# Patient Record
Sex: FEMALE | Race: BLACK OR AFRICAN AMERICAN | Employment: OTHER | URBAN - METROPOLITAN AREA
[De-identification: names, ages, dates, MRNs, and addresses within clinical notes are randomized per-mention and may not be internally consistent; named-entity substitution may affect disease eponyms.]

---

## 2017-02-06 ENCOUNTER — HOSPITAL ENCOUNTER (OUTPATIENT)
Dept: PHARMACY | Age: 72
Setting detail: THERAPIES SERIES
Discharge: HOME | End: 2017-02-06
Payer: MEDICARE

## 2017-02-06 DIAGNOSIS — I26.99 OTHER PULMONARY EMBOLISM WITHOUT ACUTE COR PULMONALE, UNSPECIFIED CHRONICITY (HCC): ICD-10-CM

## 2017-02-06 DIAGNOSIS — I82.409 DEEP VEIN THROMBOSIS (DVT) OF LOWER EXTREMITY, UNSPECIFIED CHRONICITY, UNSPECIFIED LATERALITY, UNSPECIFIED VEIN (HCC): ICD-10-CM

## 2017-02-06 LAB
INR BLD: 2.3
PROTIME: 27.5 SECONDS

## 2017-02-06 PROCEDURE — G0463 HOSPITAL OUTPT CLINIC VISIT: HCPCS

## 2017-02-06 PROCEDURE — 85610 PROTHROMBIN TIME: CPT

## 2017-02-27 ENCOUNTER — TELEPHONE (OUTPATIENT)
Dept: PHARMACY | Age: 72
End: 2017-02-27

## 2017-03-08 ENCOUNTER — HOSPITAL ENCOUNTER (OUTPATIENT)
Dept: PHARMACY | Age: 72
Setting detail: THERAPIES SERIES
Discharge: HOME | End: 2017-03-08
Payer: MEDICARE

## 2017-03-08 DIAGNOSIS — I82.409 DEEP VEIN THROMBOSIS (DVT) OF LOWER EXTREMITY, UNSPECIFIED CHRONICITY, UNSPECIFIED LATERALITY, UNSPECIFIED VEIN (HCC): ICD-10-CM

## 2017-03-08 DIAGNOSIS — I26.99 OTHER PULMONARY EMBOLISM WITHOUT ACUTE COR PULMONALE, UNSPECIFIED CHRONICITY (HCC): ICD-10-CM

## 2017-03-08 LAB
INR BLD: 1.9
PROTIME: 22.3 SECONDS

## 2017-03-29 ENCOUNTER — HOSPITAL ENCOUNTER (OUTPATIENT)
Dept: PHARMACY | Age: 72
Setting detail: THERAPIES SERIES
Discharge: HOME | End: 2017-03-29
Payer: MEDICARE

## 2017-03-29 DIAGNOSIS — I82.409 DEEP VEIN THROMBOSIS (DVT) OF LOWER EXTREMITY, UNSPECIFIED CHRONICITY, UNSPECIFIED LATERALITY, UNSPECIFIED VEIN (HCC): ICD-10-CM

## 2017-03-29 DIAGNOSIS — I26.99 OTHER PULMONARY EMBOLISM WITHOUT ACUTE COR PULMONALE, UNSPECIFIED CHRONICITY (HCC): ICD-10-CM

## 2017-03-29 LAB
INR BLD: 1.5
PROTIME: 18.1 SECONDS

## 2017-03-29 PROCEDURE — 85610 PROTHROMBIN TIME: CPT

## 2017-03-29 PROCEDURE — G0463 HOSPITAL OUTPT CLINIC VISIT: HCPCS

## 2017-04-05 ENCOUNTER — HOSPITAL ENCOUNTER (OUTPATIENT)
Dept: PHARMACY | Age: 72
Setting detail: THERAPIES SERIES
Discharge: HOME | End: 2017-04-05
Payer: MEDICARE

## 2017-04-05 DIAGNOSIS — I26.99 OTHER PULMONARY EMBOLISM WITHOUT ACUTE COR PULMONALE, UNSPECIFIED CHRONICITY (HCC): ICD-10-CM

## 2017-04-05 DIAGNOSIS — I82.409 DEEP VEIN THROMBOSIS (DVT) OF LOWER EXTREMITY, UNSPECIFIED CHRONICITY, UNSPECIFIED LATERALITY, UNSPECIFIED VEIN (HCC): ICD-10-CM

## 2017-04-05 LAB
INR BLD: 1.9
PROTIME: 22.4 SECONDS

## 2017-04-05 PROCEDURE — G0463 HOSPITAL OUTPT CLINIC VISIT: HCPCS

## 2017-04-05 PROCEDURE — 85610 PROTHROMBIN TIME: CPT

## 2017-04-19 ENCOUNTER — HOSPITAL ENCOUNTER (OUTPATIENT)
Dept: PHARMACY | Age: 72
Setting detail: THERAPIES SERIES
Discharge: HOME | End: 2017-04-19
Payer: MEDICARE

## 2017-04-19 DIAGNOSIS — I26.99 OTHER PULMONARY EMBOLISM WITHOUT ACUTE COR PULMONALE, UNSPECIFIED CHRONICITY (HCC): ICD-10-CM

## 2017-04-19 DIAGNOSIS — I82.409 DEEP VEIN THROMBOSIS (DVT) OF LOWER EXTREMITY, UNSPECIFIED CHRONICITY, UNSPECIFIED LATERALITY, UNSPECIFIED VEIN (HCC): ICD-10-CM

## 2017-04-19 LAB
INR BLD: 3.7
PROTIME: 43.9 SECONDS

## 2017-04-19 PROCEDURE — 85610 PROTHROMBIN TIME: CPT

## 2017-04-19 PROCEDURE — G0463 HOSPITAL OUTPT CLINIC VISIT: HCPCS

## 2017-05-03 ENCOUNTER — TELEPHONE (OUTPATIENT)
Dept: PHARMACY | Age: 72
End: 2017-05-03

## 2017-05-12 ENCOUNTER — HOSPITAL ENCOUNTER (OUTPATIENT)
Dept: PHARMACY | Age: 72
Setting detail: THERAPIES SERIES
Discharge: HOME | End: 2017-05-12
Payer: MEDICARE

## 2017-05-12 DIAGNOSIS — I26.99 OTHER PULMONARY EMBOLISM WITHOUT ACUTE COR PULMONALE, UNSPECIFIED CHRONICITY (HCC): ICD-10-CM

## 2017-05-12 DIAGNOSIS — I82.409 DEEP VEIN THROMBOSIS (DVT) OF LOWER EXTREMITY, UNSPECIFIED CHRONICITY, UNSPECIFIED LATERALITY, UNSPECIFIED VEIN (HCC): ICD-10-CM

## 2017-05-12 LAB
INR BLD: 2.6
PROTIME: 30.6 SECONDS

## 2017-05-12 PROCEDURE — 85610 PROTHROMBIN TIME: CPT | Performed by: PHARMACIST

## 2017-05-12 PROCEDURE — 99211 OFF/OP EST MAY X REQ PHY/QHP: CPT | Performed by: PHARMACIST

## 2017-06-02 ENCOUNTER — HOSPITAL ENCOUNTER (OUTPATIENT)
Dept: PHARMACY | Age: 72
Setting detail: THERAPIES SERIES
Discharge: HOME | End: 2017-06-02
Payer: MEDICARE

## 2017-06-02 DIAGNOSIS — I26.99 OTHER PULMONARY EMBOLISM WITHOUT ACUTE COR PULMONALE, UNSPECIFIED CHRONICITY (HCC): ICD-10-CM

## 2017-06-02 DIAGNOSIS — I82.409 DEEP VEIN THROMBOSIS (DVT) OF LOWER EXTREMITY, UNSPECIFIED CHRONICITY, UNSPECIFIED LATERALITY, UNSPECIFIED VEIN (HCC): ICD-10-CM

## 2017-06-02 LAB
INR BLD: 3.8
PROTIME: 46.2 SECONDS

## 2017-06-02 PROCEDURE — 99211 OFF/OP EST MAY X REQ PHY/QHP: CPT

## 2017-06-02 PROCEDURE — 85610 PROTHROMBIN TIME: CPT

## 2017-06-06 ENCOUNTER — TELEPHONE (OUTPATIENT)
Dept: PHARMACY | Age: 72
End: 2017-06-06

## 2017-07-13 ENCOUNTER — HOSPITAL ENCOUNTER (OUTPATIENT)
Dept: PHARMACY | Age: 72
Setting detail: THERAPIES SERIES
Discharge: HOME | End: 2017-07-13
Payer: MEDICARE

## 2017-07-13 DIAGNOSIS — I82.409 DEEP VEIN THROMBOSIS (DVT) OF LOWER EXTREMITY, UNSPECIFIED CHRONICITY, UNSPECIFIED LATERALITY, UNSPECIFIED VEIN (HCC): ICD-10-CM

## 2017-07-13 DIAGNOSIS — I26.99 OTHER PULMONARY EMBOLISM WITHOUT ACUTE COR PULMONALE, UNSPECIFIED CHRONICITY (HCC): ICD-10-CM

## 2017-07-13 LAB
INR BLD: 2.2
PROTIME: 26.4 SECONDS

## 2017-07-13 PROCEDURE — 85610 PROTHROMBIN TIME: CPT

## 2017-07-13 PROCEDURE — 99211 OFF/OP EST MAY X REQ PHY/QHP: CPT

## 2017-07-27 ENCOUNTER — HOSPITAL ENCOUNTER (OUTPATIENT)
Dept: PHARMACY | Age: 72
Setting detail: THERAPIES SERIES
Discharge: HOME | End: 2017-07-27
Payer: MEDICARE

## 2017-07-27 ENCOUNTER — TELEPHONE (OUTPATIENT)
Dept: PHARMACY | Age: 72
End: 2017-07-27

## 2017-07-27 DIAGNOSIS — I26.99 OTHER PULMONARY EMBOLISM WITHOUT ACUTE COR PULMONALE, UNSPECIFIED CHRONICITY (HCC): ICD-10-CM

## 2017-07-27 DIAGNOSIS — I82.409 DEEP VEIN THROMBOSIS (DVT) OF LOWER EXTREMITY, UNSPECIFIED CHRONICITY, UNSPECIFIED LATERALITY, UNSPECIFIED VEIN (HCC): ICD-10-CM

## 2017-07-27 LAB
INR BLD: 2.6
PROTIME: 31.7 SECONDS

## 2017-07-27 PROCEDURE — 85610 PROTHROMBIN TIME: CPT

## 2017-07-27 PROCEDURE — 99211 OFF/OP EST MAY X REQ PHY/QHP: CPT

## 2017-07-27 RX ORDER — WARFARIN SODIUM 5 MG/1
TABLET ORAL
Qty: 180 TABLET | Refills: 1 | OUTPATIENT
Start: 2017-07-27

## 2017-09-07 ENCOUNTER — HOSPITAL ENCOUNTER (OUTPATIENT)
Dept: PHARMACY | Age: 72
Setting detail: THERAPIES SERIES
Discharge: HOME | End: 2017-09-07
Payer: MEDICARE

## 2017-09-07 DIAGNOSIS — I82.409 DEEP VEIN THROMBOSIS (DVT) OF LOWER EXTREMITY, UNSPECIFIED CHRONICITY, UNSPECIFIED LATERALITY, UNSPECIFIED VEIN (HCC): ICD-10-CM

## 2017-09-07 DIAGNOSIS — I26.99 OTHER PULMONARY EMBOLISM WITHOUT ACUTE COR PULMONALE, UNSPECIFIED CHRONICITY (HCC): ICD-10-CM

## 2017-09-07 LAB
INR BLD: 2.3
PROTIME: 28 SECONDS

## 2017-09-07 PROCEDURE — 99211 OFF/OP EST MAY X REQ PHY/QHP: CPT

## 2017-09-07 PROCEDURE — 85610 PROTHROMBIN TIME: CPT

## 2017-10-19 ENCOUNTER — HOSPITAL ENCOUNTER (OUTPATIENT)
Dept: PHARMACY | Age: 72
Setting detail: THERAPIES SERIES
Discharge: HOME | End: 2017-10-19
Payer: MEDICARE

## 2017-10-19 DIAGNOSIS — I26.99 OTHER PULMONARY EMBOLISM WITHOUT ACUTE COR PULMONALE, UNSPECIFIED CHRONICITY (HCC): ICD-10-CM

## 2017-10-19 DIAGNOSIS — I82.409 DEEP VEIN THROMBOSIS (DVT) OF LOWER EXTREMITY, UNSPECIFIED CHRONICITY, UNSPECIFIED LATERALITY, UNSPECIFIED VEIN (HCC): ICD-10-CM

## 2017-10-19 LAB
INR BLD: 2.3
PROTIME: 27.1 SECONDS

## 2017-10-19 PROCEDURE — 85610 PROTHROMBIN TIME: CPT

## 2017-10-19 PROCEDURE — 99211 OFF/OP EST MAY X REQ PHY/QHP: CPT

## 2017-10-19 NOTE — PROGRESS NOTES
Patient states compliant with regimen. Bleeding or thromboembolic side effects:  none. Significant med or dietary changes:  none. Significant recent illness or disease state changes:  none. PT/INR done in office per protocol in room #2. INR today is 2.3, within therapeutic range. No changes in therapy were made today--will continue current regimen and RTC in 6 weeks (against suggested time of 5 weeks based upon INR in-range hx). Patient understands dosing directions and information discussed. Dosing schedule given to patient. Progress note sent to referring office. Pt said she still had 3mg tabs at home so I instructed her to get rid of them to avoid the possibility of confusion.

## 2017-11-30 ENCOUNTER — HOSPITAL ENCOUNTER (OUTPATIENT)
Dept: PHARMACY | Age: 72
Setting detail: THERAPIES SERIES
Discharge: HOME | End: 2017-11-30
Payer: MEDICARE

## 2017-11-30 DIAGNOSIS — I26.99 OTHER PULMONARY EMBOLISM WITHOUT ACUTE COR PULMONALE, UNSPECIFIED CHRONICITY (HCC): ICD-10-CM

## 2017-11-30 DIAGNOSIS — I82.409 DEEP VEIN THROMBOSIS (DVT) OF LOWER EXTREMITY, UNSPECIFIED CHRONICITY, UNSPECIFIED LATERALITY, UNSPECIFIED VEIN (HCC): ICD-10-CM

## 2017-11-30 LAB
INR BLD: 2.5
PROTIME: 30.6 SECONDS

## 2017-11-30 PROCEDURE — 99211 OFF/OP EST MAY X REQ PHY/QHP: CPT

## 2017-11-30 PROCEDURE — 85610 PROTHROMBIN TIME: CPT

## 2017-11-30 NOTE — PROGRESS NOTES
Patient states compliant with regimen. Bleeding or thromboembolic side effects:  none. Significant med or dietary changes:  none. Significant recent illness or disease state changes:  none. PT/INR done in office per protocol. INR today is 2.5, within therapeutic range 2.0-3.0. Will continue current regimen 10mg every day and RTC in 6 weeks. Patient seen in room # 2. Patient understands dosing directions and information discussed. Dosing schedule given to patient. Progress note sent to referring office.

## 2018-01-11 ENCOUNTER — HOSPITAL ENCOUNTER (OUTPATIENT)
Dept: PHARMACY | Age: 73
Setting detail: THERAPIES SERIES
Discharge: HOME | End: 2018-01-11
Payer: MEDICARE

## 2018-01-11 DIAGNOSIS — I82.409 DEEP VEIN THROMBOSIS (DVT) OF LOWER EXTREMITY, UNSPECIFIED CHRONICITY, UNSPECIFIED LATERALITY, UNSPECIFIED VEIN (HCC): ICD-10-CM

## 2018-01-11 DIAGNOSIS — I26.99 OTHER PULMONARY EMBOLISM WITHOUT ACUTE COR PULMONALE, UNSPECIFIED CHRONICITY (HCC): ICD-10-CM

## 2018-01-11 LAB
INR BLD: 2.8
PROTIME: 33.5 SECONDS

## 2018-01-11 PROCEDURE — 99211 OFF/OP EST MAY X REQ PHY/QHP: CPT

## 2018-01-11 PROCEDURE — 85610 PROTHROMBIN TIME: CPT

## 2018-02-20 ENCOUNTER — HOSPITAL ENCOUNTER (OUTPATIENT)
Dept: PHARMACY | Age: 73
Setting detail: THERAPIES SERIES
Discharge: HOME | End: 2018-02-20
Payer: MEDICARE

## 2018-02-20 DIAGNOSIS — I82.409 DEEP VEIN THROMBOSIS (DVT) OF LOWER EXTREMITY, UNSPECIFIED CHRONICITY, UNSPECIFIED LATERALITY, UNSPECIFIED VEIN (HCC): ICD-10-CM

## 2018-02-20 DIAGNOSIS — I26.99 OTHER PULMONARY EMBOLISM WITHOUT ACUTE COR PULMONALE, UNSPECIFIED CHRONICITY (HCC): ICD-10-CM

## 2018-02-20 LAB
INR BLD: 3
PROTIME: 36.2 SECONDS

## 2018-02-20 PROCEDURE — 85610 PROTHROMBIN TIME: CPT

## 2018-02-20 PROCEDURE — 99211 OFF/OP EST MAY X REQ PHY/QHP: CPT

## 2018-02-20 NOTE — PROGRESS NOTES
Patient states compliant with regimen. Bleeding or thromboembolic side effects:  none. Significant med or dietary changes:  none. Significant recent illness or disease state changes:  Patient was sick with a cold a few weeks ago. She is feeling better now. She started drinking ginger ale while she was sick. She usually does not drink any pop. Instructed the patient that ginger ale can increase INR when consumed in large quantities, and to stay consistent if she plans on continuing it. Patient states that she plans on stopping, and she just used it while she was not feeling well. PT/INR done in office per protocol. INR goal:  2.0-3.0  INR today:  3.0   Plan: Continue current regimen of 10 mg every day. Next INR check in:   six weeks. Patient seen in room # one. Patient acknowledges working in [address] [place] [address] with Pharmacist as referred by his/her physician. Patient understands dosing directions and information discussed. Dosing schedule given to patient. Progress note sent to referring office.

## 2018-02-22 ENCOUNTER — APPOINTMENT (OUTPATIENT)
Dept: PHARMACY | Age: 73
End: 2018-02-22
Payer: MEDICARE

## 2018-04-03 ENCOUNTER — HOSPITAL ENCOUNTER (OUTPATIENT)
Dept: PHARMACY | Age: 73
Setting detail: THERAPIES SERIES
Discharge: HOME | End: 2018-04-03
Payer: MEDICARE

## 2018-04-03 DIAGNOSIS — I82.409 DEEP VEIN THROMBOSIS (DVT) OF LOWER EXTREMITY, UNSPECIFIED CHRONICITY, UNSPECIFIED LATERALITY, UNSPECIFIED VEIN (HCC): ICD-10-CM

## 2018-04-03 DIAGNOSIS — I26.99 OTHER PULMONARY EMBOLISM WITHOUT ACUTE COR PULMONALE, UNSPECIFIED CHRONICITY (HCC): ICD-10-CM

## 2018-04-03 LAB
INR BLD: 3
PROTIME: 35.7 SECONDS

## 2018-04-03 PROCEDURE — 99211 OFF/OP EST MAY X REQ PHY/QHP: CPT

## 2018-04-03 PROCEDURE — 85610 PROTHROMBIN TIME: CPT

## 2018-05-15 ENCOUNTER — HOSPITAL ENCOUNTER (OUTPATIENT)
Dept: PHARMACY | Age: 73
Setting detail: THERAPIES SERIES
Discharge: HOME | End: 2018-05-15
Payer: MEDICARE

## 2018-05-15 DIAGNOSIS — I26.99 OTHER PULMONARY EMBOLISM WITHOUT ACUTE COR PULMONALE, UNSPECIFIED CHRONICITY (HCC): ICD-10-CM

## 2018-05-15 DIAGNOSIS — I82.409 DEEP VEIN THROMBOSIS (DVT) OF LOWER EXTREMITY, UNSPECIFIED CHRONICITY, UNSPECIFIED LATERALITY, UNSPECIFIED VEIN (HCC): ICD-10-CM

## 2018-05-15 LAB
INR BLD: 2.1
PROTIME: 24.9 SECONDS

## 2018-05-15 PROCEDURE — 85610 PROTHROMBIN TIME: CPT

## 2018-05-15 PROCEDURE — 99211 OFF/OP EST MAY X REQ PHY/QHP: CPT

## 2018-06-26 ENCOUNTER — HOSPITAL ENCOUNTER (OUTPATIENT)
Dept: PHARMACY | Age: 73
Setting detail: THERAPIES SERIES
Discharge: HOME | End: 2018-06-26
Payer: MEDICARE

## 2018-06-26 DIAGNOSIS — I82.409 DEEP VEIN THROMBOSIS (DVT) OF LOWER EXTREMITY, UNSPECIFIED CHRONICITY, UNSPECIFIED LATERALITY, UNSPECIFIED VEIN (HCC): ICD-10-CM

## 2018-06-26 DIAGNOSIS — I26.99 OTHER PULMONARY EMBOLISM WITHOUT ACUTE COR PULMONALE, UNSPECIFIED CHRONICITY (HCC): ICD-10-CM

## 2018-06-26 LAB
INR BLD: 2.1
PROTIME: 25.6 SECONDS

## 2018-06-26 PROCEDURE — 99211 OFF/OP EST MAY X REQ PHY/QHP: CPT

## 2018-06-26 PROCEDURE — 85610 PROTHROMBIN TIME: CPT

## 2018-08-07 ENCOUNTER — HOSPITAL ENCOUNTER (OUTPATIENT)
Dept: PHARMACY | Age: 73
Setting detail: THERAPIES SERIES
Discharge: HOME | End: 2018-08-07
Payer: MEDICARE

## 2018-08-07 DIAGNOSIS — I82.409 DEEP VEIN THROMBOSIS (DVT) OF LOWER EXTREMITY, UNSPECIFIED CHRONICITY, UNSPECIFIED LATERALITY, UNSPECIFIED VEIN (HCC): ICD-10-CM

## 2018-08-07 DIAGNOSIS — I26.99 OTHER PULMONARY EMBOLISM WITHOUT ACUTE COR PULMONALE, UNSPECIFIED CHRONICITY (HCC): ICD-10-CM

## 2018-08-07 LAB
INR BLD: 3.1
PROTIME: 37.1 SECONDS

## 2018-08-07 PROCEDURE — 85610 PROTHROMBIN TIME: CPT

## 2018-08-07 PROCEDURE — 99212 OFFICE O/P EST SF 10 MIN: CPT

## 2018-08-16 ENCOUNTER — TELEPHONE (OUTPATIENT)
Dept: PHARMACY | Age: 73
End: 2018-08-16

## 2018-08-16 NOTE — TELEPHONE ENCOUNTER
Ofe Hanks called today for medical clearance to hold warfarin prior to surgical procedure currently scheduled for 9/5/18, she states Dr Radha Macias usually holds for 3-5 days prior. I informed her MD needs to give clearance and directive related to need for Lovenox. She will contact Dr. José Miguel Dye.

## 2018-08-24 ENCOUNTER — TELEPHONE (OUTPATIENT)
Dept: PHARMACY | Age: 73
End: 2018-08-24

## 2018-09-11 ENCOUNTER — HOSPITAL ENCOUNTER (OUTPATIENT)
Dept: PHARMACY | Age: 73
Setting detail: THERAPIES SERIES
Discharge: HOME | End: 2018-09-11
Payer: MEDICARE

## 2018-09-11 DIAGNOSIS — I82.409 DEEP VEIN THROMBOSIS (DVT) OF LOWER EXTREMITY, UNSPECIFIED CHRONICITY, UNSPECIFIED LATERALITY, UNSPECIFIED VEIN (HCC): ICD-10-CM

## 2018-09-11 DIAGNOSIS — I26.99 OTHER PULMONARY EMBOLISM WITHOUT ACUTE COR PULMONALE, UNSPECIFIED CHRONICITY (HCC): ICD-10-CM

## 2018-09-11 LAB
INR BLD: 1.5
PROTIME: 17.8 SECONDS

## 2018-09-11 PROCEDURE — 99212 OFFICE O/P EST SF 10 MIN: CPT

## 2018-09-11 PROCEDURE — 85610 PROTHROMBIN TIME: CPT

## 2018-09-11 NOTE — PROGRESS NOTES
information discussed. Dosing schedule given to patient. Progress note sent to referring office. Patient acknowledges working in consult agreement with pharmacist as referred by his/her physician.         Electronically signed by Niki Ramires on 9/11/18 at 11:10 AM

## 2018-10-22 ENCOUNTER — HOSPITAL ENCOUNTER (OUTPATIENT)
Dept: PHARMACY | Age: 73
Setting detail: THERAPIES SERIES
Discharge: HOME | End: 2018-10-22
Payer: MEDICARE

## 2018-10-22 DIAGNOSIS — I82.409 DEEP VEIN THROMBOSIS (DVT) OF LOWER EXTREMITY, UNSPECIFIED CHRONICITY, UNSPECIFIED LATERALITY, UNSPECIFIED VEIN (HCC): ICD-10-CM

## 2018-10-22 DIAGNOSIS — I26.99 OTHER PULMONARY EMBOLISM WITHOUT ACUTE COR PULMONALE, UNSPECIFIED CHRONICITY (HCC): ICD-10-CM

## 2018-10-22 LAB
INR BLD: 2.6
PROTIME: 31.2 SECONDS

## 2018-10-22 PROCEDURE — 99211 OFF/OP EST MAY X REQ PHY/QHP: CPT

## 2018-10-22 PROCEDURE — 85610 PROTHROMBIN TIME: CPT

## 2018-11-19 ENCOUNTER — HOSPITAL ENCOUNTER (OUTPATIENT)
Dept: PHARMACY | Age: 73
Setting detail: THERAPIES SERIES
Discharge: HOME | End: 2018-11-19
Payer: MEDICARE

## 2018-11-19 ENCOUNTER — APPOINTMENT (OUTPATIENT)
Dept: PHARMACY | Age: 73
End: 2018-11-19
Payer: MEDICARE

## 2018-11-19 ENCOUNTER — TELEPHONE (OUTPATIENT)
Dept: PHARMACY | Age: 73
End: 2018-11-19

## 2018-11-19 DIAGNOSIS — I26.99 OTHER PULMONARY EMBOLISM WITHOUT ACUTE COR PULMONALE, UNSPECIFIED CHRONICITY (HCC): ICD-10-CM

## 2018-11-19 DIAGNOSIS — I82.409 DEEP VEIN THROMBOSIS (DVT) OF LOWER EXTREMITY, UNSPECIFIED CHRONICITY, UNSPECIFIED LATERALITY, UNSPECIFIED VEIN (HCC): ICD-10-CM

## 2018-11-19 LAB
INR BLD: 3.5
PROTIME: 42.4 SECONDS

## 2018-11-19 PROCEDURE — 99212 OFFICE O/P EST SF 10 MIN: CPT

## 2018-11-19 PROCEDURE — 85610 PROTHROMBIN TIME: CPT

## 2018-11-19 NOTE — PROGRESS NOTES
Medication Management Service  GERALDINE ADAMSON [facility]  [phone]    Visit Date: 11/19/2018   Subjective:       Dianna Flower is a 74 y.o. female who presents to clinic today for anticoagulation monitoring and adjustment. Patient seen in clinic for warfarin management due to  Indication:   PE.   INR goal: of 2.0-3.0. Duration of therapy: indefinite. Patient reports the following:   Adherent with regimen  Missed or extra doses:  None   Bleeding or thromboembolic side effects:  None  Significant medication, dietary, alcohol, or tobacco changes:  None  Significant recent illness, disease state changes, or hospitalization:  Increased pain past month. Increased stress past 2 weeks. Patient's  has been in and out of the hospital.  Upcoming surgeries or procedures:  None           Assessment and PLAN     PT/INR done in office per protocol. INR today is 3.5, supratherapeutic. Unable to find a definite cause of elevated INR. Could be a combination of increased pain, increased stress, and decreased vitamin K intake. Plan: Will have patient take 5 mg today (normally 10 mg, one time decrease). Then will continue current regimen of warfarin 10 mg on all days of the week. Using warfarin 5 mg tablets. Recheck INR in 2 weeks. Patient seen in room # 2. Patient verbalized understanding of dosing directions and information discussed. Dosing schedule given to patient. Progress note sent to referring office. Patient acknowledges working in consult agreement with pharmacist as referred by his/her physician.       Electronically signed by Sirena Thompson on 11/19/18 at 11:29 AM

## 2018-12-03 ENCOUNTER — HOSPITAL ENCOUNTER (OUTPATIENT)
Dept: PHARMACY | Age: 73
Setting detail: THERAPIES SERIES
Discharge: HOME | End: 2018-12-03
Payer: MEDICARE

## 2018-12-03 DIAGNOSIS — I26.99 OTHER PULMONARY EMBOLISM WITHOUT ACUTE COR PULMONALE, UNSPECIFIED CHRONICITY (HCC): ICD-10-CM

## 2018-12-03 DIAGNOSIS — I82.409 DEEP VEIN THROMBOSIS (DVT) OF LOWER EXTREMITY, UNSPECIFIED CHRONICITY, UNSPECIFIED LATERALITY, UNSPECIFIED VEIN (HCC): ICD-10-CM

## 2018-12-03 LAB
INR BLD: 2.9
PROTIME: 34.8 SECONDS

## 2018-12-03 PROCEDURE — 99211 OFF/OP EST MAY X REQ PHY/QHP: CPT

## 2018-12-03 PROCEDURE — 85610 PROTHROMBIN TIME: CPT

## 2018-12-27 ENCOUNTER — HOSPITAL ENCOUNTER (OUTPATIENT)
Dept: PHARMACY | Age: 73
Setting detail: THERAPIES SERIES
Discharge: HOME | End: 2018-12-27
Payer: MEDICARE

## 2018-12-27 DIAGNOSIS — I26.99 OTHER PULMONARY EMBOLISM WITHOUT ACUTE COR PULMONALE, UNSPECIFIED CHRONICITY (HCC): ICD-10-CM

## 2018-12-27 DIAGNOSIS — I82.409 DEEP VEIN THROMBOSIS (DVT) OF LOWER EXTREMITY, UNSPECIFIED CHRONICITY, UNSPECIFIED LATERALITY, UNSPECIFIED VEIN (HCC): ICD-10-CM

## 2018-12-27 LAB
INR BLD: 4.4
PROTIME: 52.4 SECONDS

## 2018-12-27 PROCEDURE — 85610 PROTHROMBIN TIME: CPT

## 2018-12-27 PROCEDURE — 99212 OFFICE O/P EST SF 10 MIN: CPT

## 2018-12-27 NOTE — PROGRESS NOTES
Medication Management Service  GERALDINE ADAMSON [facility]  [phone]    Visit Date: 12/27/2018   Subjective:       Fadia Adam is a 74 y.o. female who presents to clinic today for anticoagulation monitoring and adjustment. Patient seen in clinic for warfarin management due to  Indication:   DVT and PE. INR goal: of 2.0-3.0. Duration of therapy: indefinite. Patient reports the following:   Adherent with regimen  Missed or extra doses:  None   Bleeding or thromboembolic side effects:  None  Significant medication, dietary, alcohol, or tobacco changes:  None  Significant recent illness, disease state changes, or hospitalization:  None  Upcoming surgeries or procedures:  None           Assessment and PLAN     PT/INR done in office per protocol. INR today is 4.4, supratherapeutic. Plan:  The patient states that she is still recovering for pneumonia. Delon Neri was on an antibiotic for pneumonia and was also using numerous over the counter products. Her last dose of the antibiotic was on Saturday, and she states that she is no longer using over the counter products. Based on this information I will have the patient hold today's dose and will have the patient decrease her dose to 5 mg tomorrow. I requested that the patient return in one week; however, the patient refused. I reviewed with the patient the risk of her INR being outside of range, and the patient expressed understanding. The patient will return to the clinic in two weeks. The patient will resume her maintenance regimen on Saturday, December 29th of warfarin 10 mg daily. Using warfarin 5 mg tablets. Recheck INR in two week(s). Patient seen in room # One. Patient verbalized understanding of dosing directions and information discussed. Dosing schedule given to patient. Progress note sent to referring office. Patient acknowledges working in consult agreement with pharmacist as referred by his/her physician. Electronically signed by Zully Reed, [address] [facility] on 12/27/18 at 10:57 AM

## 2019-01-10 ENCOUNTER — HOSPITAL ENCOUNTER (OUTPATIENT)
Dept: PHARMACY | Age: 74
Setting detail: THERAPIES SERIES
Discharge: HOME | End: 2019-01-10
Payer: MEDICARE

## 2019-01-10 DIAGNOSIS — I82.409 DEEP VEIN THROMBOSIS (DVT) OF LOWER EXTREMITY, UNSPECIFIED CHRONICITY, UNSPECIFIED LATERALITY, UNSPECIFIED VEIN (HCC): ICD-10-CM

## 2019-01-10 DIAGNOSIS — I26.99 OTHER PULMONARY EMBOLISM WITHOUT ACUTE COR PULMONALE, UNSPECIFIED CHRONICITY (HCC): ICD-10-CM

## 2019-01-10 LAB
INR BLD: 3.2
PROTIME: 38.2 SECONDS

## 2019-01-10 PROCEDURE — 85610 PROTHROMBIN TIME: CPT

## 2019-01-10 PROCEDURE — 99212 OFFICE O/P EST SF 10 MIN: CPT

## 2019-01-15 ENCOUNTER — TELEPHONE (OUTPATIENT)
Dept: PHARMACY | Age: 74
End: 2019-01-15

## 2020-11-27 ENCOUNTER — HOSPITAL ENCOUNTER (EMERGENCY)
Age: 75
Discharge: HOME | End: 2020-11-27
Attending: EMERGENCY MEDICINE
Payer: MEDICARE

## 2020-11-27 ENCOUNTER — APPOINTMENT (OUTPATIENT)
Dept: CT IMAGING | Age: 75
End: 2020-11-27
Payer: MEDICARE

## 2020-11-27 ENCOUNTER — APPOINTMENT (OUTPATIENT)
Dept: GENERAL RADIOLOGY | Age: 75
End: 2020-11-27
Payer: MEDICARE

## 2020-11-27 VITALS
TEMPERATURE: 98 F | RESPIRATION RATE: 18 BRPM | HEART RATE: 56 BPM | HEIGHT: 67 IN | OXYGEN SATURATION: 98 % | WEIGHT: 293 LBS | BODY MASS INDEX: 45.99 KG/M2 | SYSTOLIC BLOOD PRESSURE: 146 MMHG | DIASTOLIC BLOOD PRESSURE: 67 MMHG

## 2020-11-27 PROCEDURE — 73080 X-RAY EXAM OF ELBOW: CPT

## 2020-11-27 PROCEDURE — 70450 CT HEAD/BRAIN W/O DYE: CPT

## 2020-11-27 PROCEDURE — 6370000000 HC RX 637 (ALT 250 FOR IP): Performed by: NURSE PRACTITIONER

## 2020-11-27 PROCEDURE — 73030 X-RAY EXAM OF SHOULDER: CPT

## 2020-11-27 PROCEDURE — 99283 EMERGENCY DEPT VISIT LOW MDM: CPT

## 2020-11-27 RX ORDER — HYDROCODONE BITARTRATE AND ACETAMINOPHEN 5; 325 MG/1; MG/1
2 TABLET ORAL ONCE
Status: COMPLETED | OUTPATIENT
Start: 2020-11-27 | End: 2020-11-27

## 2020-11-27 RX ORDER — HYDROCODONE BITARTRATE AND ACETAMINOPHEN 5; 325 MG/1; MG/1
1 TABLET ORAL EVERY 6 HOURS PRN
Qty: 12 TABLET | Refills: 0 | Status: SHIPPED | OUTPATIENT
Start: 2020-11-27 | End: 2020-11-30

## 2020-11-27 RX ADMIN — HYDROCODONE BITARTRATE AND ACETAMINOPHEN 2 TABLET: 5; 325 TABLET ORAL at 15:54

## 2020-11-27 ASSESSMENT — ENCOUNTER SYMPTOMS
RHINORRHEA: 0
DIARRHEA: 0
WHEEZING: 0
NAUSEA: 0
COUGH: 0
CONSTIPATION: 0
SHORTNESS OF BREATH: 0
ABDOMINAL PAIN: 0
SINUS PRESSURE: 0
VOMITING: 0
COLOR CHANGE: 0
SORE THROAT: 0

## 2020-11-27 ASSESSMENT — PAIN DESCRIPTION - PROGRESSION: CLINICAL_PROGRESSION: NOT CHANGED

## 2020-11-27 ASSESSMENT — PAIN DESCRIPTION - PAIN TYPE: TYPE: ACUTE PAIN

## 2020-11-27 ASSESSMENT — PAIN DESCRIPTION - LOCATION: LOCATION: ARM;SHOULDER

## 2020-11-27 ASSESSMENT — PAIN SCALES - GENERAL: PAINLEVEL_OUTOF10: 10

## 2020-11-27 ASSESSMENT — PAIN DESCRIPTION - ONSET: ONSET: ON-GOING

## 2020-11-27 ASSESSMENT — PAIN DESCRIPTION - FREQUENCY: FREQUENCY: CONTINUOUS

## 2020-11-27 ASSESSMENT — PAIN DESCRIPTION - DESCRIPTORS: DESCRIPTORS: ACHING;DULL;NUMBNESS

## 2020-11-27 ASSESSMENT — PAIN DESCRIPTION - ORIENTATION: ORIENTATION: LEFT

## 2020-11-27 NOTE — ED PROVIDER NOTES
[address] [place] [address] ED  eMERGENCY dEPARTMENT eNCOUnter      Pt Name: Debby Ogden  MRN: [id number]  Bethanygftom 1945  Date of evaluation: 11/27/2020  Provider: Katherine Garrido NP, ADELA - [address]       Chief Complaint   Patient presents with    Fall     tripped on object 11/26. Pt states she hit her head but denies LOC    Shoulder Pain     left side    Arm Pain     left side         HISTORY OF PRESENT ILLNESS  (Location/Symptom, Timing/Onset, Context/Setting, Quality, Duration, Modifying Factors, Severity.)   Debby Ogden is a 66 y.o. female who presents to the emergency department private vehicle for evaluation of a fall. She states that yesterday she tripped over an object and fell. She states that she had the left arm and shoulder on a cabinet. She has pain to the left shoulder and elbow. She also states that she hit her head denies any loss of consciousness but she does have a severe headache now. She does take Coumadin. She denies any nausea or vomiting. She states they went to the urgent care and did some x-rays and they said there was a possible break in the shoulder and to come to the emergency room for evaluation. Nursing Notes were reviewed. ALLERGIES     Gabapentin    CURRENT MEDICATIONS       Discharge Medication List as of 11/27/2020  4:52 PM      CONTINUE these medications which have NOT CHANGED    Details   warfarin (COUMADIN) 5 MG tablet Take 2 tablets by mouth once daily as directed by medication management. #180 tablets = 90 day supply, Disp-180 tablet, R-1Print      losartan (COZAAR) 100 MG tablet Take 100 mg by mouth daily. traMADol (ULTRAM) 50 MG tablet Take 50 mg by mouth every 6 hours as needed for Pain. acetaminophen-codeine (TYLENOL/CODEINE #3) 300-30 MG per tablet Take 2 tablets by mouth 3 times daily. , Disp-18 tablet, R-0      aspirin 81 MG tablet Take 81 mg by mouth daily.       triamterene-hydrochlorothiazide (DYAZIDE) 37.5-25 MG per capsule Take 1 capsule by mouth every morning. metoprolol (LOPRESSOR) 25 MG tablet Take 25 mg by mouth 2 times daily. PAST MEDICAL HISTORY         Diagnosis Date    Arthritis     Chronic back pain     Embolism - blood clot     right leg    Hyperlipidemia     Hypertension     Obesity     Osteoarthritis        SURGICAL HISTORY           Procedure Laterality Date    BACK SURGERY      fusion x2    TOTAL KNEE ARTHROPLASTY Bilateral          FAMILY HISTORY           Problem Relation Age of Onset    Arthritis Mother     High Blood Pressure Mother     High Cholesterol Mother     Diabetes Sister     High Blood Pressure Sister     High Cholesterol Sister     Diabetes Brother     Diabetes Brother      No family status information on file. SOCIAL HISTORY      reports that she has never smoked. She has never used smokeless tobacco. She reports that she does not drink alcohol or use drugs. REVIEW OF SYSTEMS    (2-9 systems for level 4, 10 or more for level 5)     Review of Systems   Constitutional: Negative for chills, fever and unexpected weight change. HENT: Negative for congestion, rhinorrhea, sinus pressure and sore throat. Respiratory: Negative for cough, shortness of breath and wheezing. Cardiovascular: Negative for chest pain and palpitations. Gastrointestinal: Negative for abdominal pain, constipation, diarrhea, nausea and vomiting. Genitourinary: Negative for dysuria and hematuria. Musculoskeletal: Negative for arthralgias and myalgias. Skin: Negative for color change and rash. Neurological: Negative for dizziness, weakness and headaches. Hematological: Negative for adenopathy. All other systems reviewed and are negative. Except as noted above the remainder of the review of systems was reviewed and negative.      PHYSICAL EXAM    (up to 7 for level 4, 8 or more for level 5)     ED Triage Vitals [11/27/20 1509]   BP Temp Temp Source Pulse Resp SpO2 Height Weight   (!) 146/67 98 °F (36.7 °C) Oral 56 18 98 % 5' 7\" (1.702 m) (!) 320 lb (145.2 kg)       Physical Exam  Vitals signs reviewed. Constitutional:       Appearance: She is well-developed. HENT:      Head: Normocephalic and atraumatic. Eyes:      Conjunctiva/sclera: Conjunctivae normal.      Pupils: Pupils are equal, round, and reactive to light. Neck:      Musculoskeletal: Normal range of motion and neck supple. Cardiovascular:      Rate and Rhythm: Normal rate and regular rhythm. Pulmonary:      Effort: Pulmonary effort is normal. No respiratory distress. Breath sounds: Normal breath sounds. No stridor. Abdominal:      General: Bowel sounds are normal.      Palpations: Abdomen is soft. Musculoskeletal: Normal range of motion. Left shoulder: She exhibits tenderness and bony tenderness. Left elbow: Tenderness found. Lymphadenopathy:      Cervical: No cervical adenopathy. Skin:     General: Skin is warm and dry. Findings: No rash. Neurological:      Mental Status: She is alert and oriented to person, place, and time. RADIOLOGY:   Non-plain film images such as CT, Ultrasound and MRI are read by the radiologist. Plain radiographic images are visualized and preliminarily interpreted by the emergency physician with the below findings:    Xr Elbow Left (min 3 Views)    Result Date: 11/27/2020  EXAMINATION: THREE XRAY VIEWS OF THE LEFT ELBOW; TWO XRAY VIEWS OF THE LEFT SHOULDER 11/27/2020 4:26 pm COMPARISON: None. HISTORY: ORDERING SYSTEM PROVIDED HISTORY: Pain TECHNOLOGIST PROVIDED HISTORY: Pain Reason for Exam: Pt eval for pain s/p fall w pain to left elbow and shoulder Acuity: Unknown Type of Exam: Unknown FINDINGS: Left shoulder: Mild osteoarthritis affects the acromioclavicular joint. No radiographic evidence of acute fracture or dislocation is seen. No periarticular calcification changes are seen.  Left elbow: No radiographic evidence of acute fracture or dislocation is seen. No evidence of displacement of the elbow fat pad is seen to suggest underlying joint effusion. There is mild spurring of the coronoid process. Degenerative changes of the left shoulder and the elbow without radiographic evidence of acute fracture or dislocation seen. Ct Head Wo Contrast    Result Date: 11/27/2020  EXAMINATION: CT OF THE HEAD WITHOUT CONTRAST  11/27/2020 3:48 pm TECHNIQUE: CT of the head was performed without the administration of intravenous contrast. Dose modulation, iterative reconstruction, and/or weight based adjustment of the mA/kV was utilized to reduce the radiation dose to as low as reasonably achievable. COMPARISON: April 7, 2011 HISTORY: ORDERING SYSTEM PROVIDED HISTORY: head injury on coumadin TECHNOLOGIST PROVIDED HISTORY: head injury on coumadin Reason for Exam: head injury Acuity: Acute Type of Exam: Initial Mechanism of Injury: fall Relevant Medical/Surgical History: on coumadin FINDINGS: BRAIN/VENTRICLES: There is no acute intracranial hemorrhage, mass effect or midline shift. No abnormal extra-axial fluid collection. The gray-white differentiation is maintained without evidence of an acute infarct. There is no evidence of hydrocephalus. ORBITS: The visualized portion of the orbits demonstrate no acute abnormality. SINUSES: The visualized paranasal sinuses and mastoid air cells demonstrate no acute abnormality. SOFT TISSUES/SKULL:  No acute abnormality of the visualized skull or soft tissues. No acute intracranial abnormality. Xr Shoulder Left (min 2 Views)    Result Date: 11/27/2020  EXAMINATION: THREE XRAY VIEWS OF THE LEFT ELBOW; TWO XRAY VIEWS OF THE LEFT SHOULDER 11/27/2020 4:26 pm COMPARISON: None.  HISTORY: ORDERING SYSTEM PROVIDED HISTORY: Pain TECHNOLOGIST PROVIDED HISTORY: Pain Reason for Exam: Pt eval for pain s/p fall w pain to left elbow and shoulder Acuity: Unknown Type of Exam: Unknown FINDINGS: Left shoulder: Mild osteoarthritis affects the acromioclavicular joint. No radiographic evidence of acute fracture or dislocation is seen. No periarticular calcification changes are seen. Left elbow: No radiographic evidence of acute fracture or dislocation is seen. No evidence of displacement of the elbow fat pad is seen to suggest underlying joint effusion. There is mild spurring of the coronoid process. Degenerative changes of the left shoulder and the elbow without radiographic evidence of acute fracture or dislocation seen. Interpretation per the Radiologist below, if available at the time of this note:    XR ELBOW LEFT (MIN 3 VIEWS)   Final Result   Degenerative changes of the left shoulder and the elbow without radiographic   evidence of acute fracture or dislocation seen. XR SHOULDER LEFT (MIN 2 VIEWS)   Final Result   Degenerative changes of the left shoulder and the elbow without radiographic   evidence of acute fracture or dislocation seen. CT HEAD WO CONTRAST   Final Result   No acute intracranial abnormality. LABS:  Labs Reviewed - No data to display    All other labs were within normal range or not returned as of this dictation. EMERGENCY DEPARTMENT COURSE and DIFFERENTIAL DIAGNOSIS/MDM:   Vitals:    Vitals:    11/27/20 1509   BP: (!) 146/67   Pulse: 56   Resp: 18   Temp: 98 °F (36.7 °C)   TempSrc: Oral   SpO2: 98%   Weight: (!) 320 lb (145.2 kg)   Height: 5' 7\" (1.702 m)       Medical Decision Making: Wound is placed in a sling, application checked by me and found to be appropriate. She will be discharged home with some pain medication. She was told to not take the Norco if she is taking her home tramadol and vice versa. Follow-up with the orthopedic specialist if pain persists    FINAL IMPRESSION      1.  Contusion of multiple sites of left shoulder and upper arm, initial encounter          DISPOSITION/PLAN   DISPOSITION Decision To Discharge 11/27/2020 04:50:26 PM      PATIENT REFERRED TO:   Oneal Valenzuela, [address] [facility]  [phone]    Schedule an appointment as soon as possible for a visit       [facility] ED  [address] [facility]  [phone]    If symptoms worsen      DISCHARGE MEDICATIONS:     Discharge Medication List as of 11/27/2020  4:52 PM      START taking these medications    Details   HYDROcodone-acetaminophen (NORCO) 5-325 MG per tablet Take 1 tablet by mouth every 6 hours as needed for Pain for up to 3 days. , Disp-12 tablet,R-0Print                 (Please note that portions of this note were completed with a voice recognition program.  Efforts were made to edit the dictations but occasionally words are mis-transcribed.)    [address] [facility] RANJITH, ADELA - CNP  Certified Nurse Practitioner          ADELA Albrecht CNP  11/27/20 [id number]

## 2020-11-27 NOTE — ED PROVIDER NOTES
The patient was seen and examined by me in conjunction with the mid-level provider. I agree with his/her assessment and treatment plan. CT brain, left shoulder x-ray, and left elbow x-ray my interpretation shows no acute findings.      Dalton Cordoba MD  11/27/20 9930

## 2020-11-27 NOTE — ED NOTES
RN contacted by [employer] tech who states patient will not straighten arm for Xray. Pt informed to get onto stretcher instead of wheelchair for portable Xray. Pt states \"Well I still can't straighten it. \"  RN informs patient that it will still be painful, but temporary to get proper Xray. Pt states \"we'll see. \"             John Gallegos RN  11/27/20 9212

## 2020-12-01 ENCOUNTER — TELEPHONE (OUTPATIENT)
Dept: ORTHOPEDIC SURGERY | Age: 75
End: 2020-12-01

## 2020-12-01 NOTE — TELEPHONE ENCOUNTER
----- Message from Gabo Alvarez MD sent at 12/1/2020  8:52 AM EST -----  Hi,    I have reviewed this patient's cc'd chart and would like this patient to be scheduled for my clinic. Please let me know if there are any issues with this or any barriers to accommodating this request. Thanks in advance!      Sincerely,    Evens Charles MD  Orthopedic Surgery    ----- Message -----  From: Brian Snyder MD  Sent: 11/27/2020   5:07 PM EST  To: Gabo Alvarez MD

## 2020-12-01 NOTE — TELEPHONE ENCOUNTER
Patient was recently seen in ED and was contacted to be scheduled for a follow up with Dr. Kris Giang. It was found that patient has [employer] therefore we would be unable to schedule as we do not participate with [facility].